# Patient Record
(demographics unavailable — no encounter records)

---

## 2019-04-22 NOTE — DIAGNOSTIC IMAGING REPORT
PROCEDURE: CT angiography of the chest with contrast.



TECHNIQUE: Multiple contiguous axial images were obtained through

the chest after uneventful bolus administration of intravenous

contrast. 2D reconstructed CTA MIP acquisitions were also

performed.

Auto Exposure Controls were utilized during the CT exam to meet

ALARA standards for radiation dose reduction. 

 

INDICATION: Difficulty breathing on exertion, shortness of air,

left-sided chest pain.



COMPARISON: None.



FINDINGS:

Pulmonary arteries demonstrate no filling defect to suggest

pulmonary embolism.  The heart size is unremarkable. Thoracic

aorta normal in contour. 



The lung fields are clear of infiltrate. Scattered areas of

peribronchial thickening are present. Likely some atelectatic

change about the lingula. No significant effusion. Likely some

degree of hepatic steatosis. A few mildly prominent but non

pathologically enlarged upper abdominal lymph nodes.



The visualized osseous structures demonstrate no acute findings.



IMPRESSION:

1. No CTA evidence for pulmonary embolism. No acute abnormality

about the chest. Likely minimal atelectasis about the lingula.



Dictated by: 



  Dictated on workstation # [id number]

## 2019-04-22 NOTE — DIAGNOSTIC IMAGING REPORT
PROCEDURE: US Venous Lower Ext Vinicio.



TECHNIQUE: Multiple real-time grayscale images were obtained over

the lower extremities in various projections, bilaterally.

Additional duplex Doppler and color Doppler images were also

obtained.



INDICATION: Bilateral leg swelling. Dyspnea, hemoptysis.



CORRELATION STUDY:  None



FINDINGS: Color and grayscale sonographic images demonstrate no

intraluminal defect within the visualized portion of the common

femoral, superficial femoral and/or popliteal veins to suggest

thrombus formation. These vessels demonstrate normal response to

compression and augmentation. 



No soft tissue fluid collection.



IMPRESSION: 

1. Negative for deep venous thrombosis of either leg.     







Dictated by: 



  Dictated on workstation # [id number]